# Patient Record
Sex: MALE | Race: WHITE
[De-identification: names, ages, dates, MRNs, and addresses within clinical notes are randomized per-mention and may not be internally consistent; named-entity substitution may affect disease eponyms.]

---

## 2017-01-31 ENCOUNTER — HOSPITAL ENCOUNTER (OUTPATIENT)
Dept: HOSPITAL 84 - D.CATH | Age: 52
Discharge: HOME | End: 2017-01-31
Attending: INTERNAL MEDICINE
Payer: COMMERCIAL

## 2017-01-31 VITALS
WEIGHT: 315 LBS | WEIGHT: 315 LBS | HEIGHT: 70 IN | BODY MASS INDEX: 45.1 KG/M2 | HEIGHT: 70 IN | BODY MASS INDEX: 45.1 KG/M2

## 2017-01-31 VITALS — DIASTOLIC BLOOD PRESSURE: 88 MMHG | SYSTOLIC BLOOD PRESSURE: 142 MMHG

## 2017-01-31 DIAGNOSIS — M17.9: ICD-10-CM

## 2017-01-31 DIAGNOSIS — I10: ICD-10-CM

## 2017-01-31 DIAGNOSIS — I20.9: Primary | ICD-10-CM

## 2017-01-31 DIAGNOSIS — R06.02: ICD-10-CM

## 2017-01-31 LAB
ANION GAP SERPL CALC-SCNC: 12.9 MMOL/L (ref 8–16)
BASOPHILS NFR BLD AUTO: 0.2 % (ref 0–2)
BUN SERPL-MCNC: 19 MG/DL (ref 7–18)
CALCIUM SERPL-MCNC: 9.3 MG/DL (ref 8.5–10.1)
CHLORIDE SERPL-SCNC: 104 MMOL/L (ref 98–107)
CO2 SERPL-SCNC: 28.3 MMOL/L (ref 21–32)
CREAT SERPL-MCNC: 0.8 MG/DL (ref 0.6–1.3)
EOSINOPHIL NFR BLD: 3.1 % (ref 0–7)
ERYTHROCYTE [DISTWIDTH] IN BLOOD BY AUTOMATED COUNT: 13.8 % (ref 11.5–14.5)
GLUCOSE SERPL-MCNC: 89 MG/DL (ref 74–106)
HCT VFR BLD CALC: 42.2 % (ref 42–54)
HGB BLD-MCNC: 13.8 G/DL (ref 13.5–17.5)
IMM GRANULOCYTES NFR BLD: 0.3 % (ref 0–5)
LYMPHOCYTES NFR BLD AUTO: 30.6 % (ref 15–50)
MCH RBC QN AUTO: 27.5 PG (ref 26–34)
MCHC RBC AUTO-ENTMCNC: 32.7 G/DL (ref 31–37)
MCV RBC: 84.1 FL (ref 80–100)
MONOCYTES NFR BLD: 12.2 % (ref 2–11)
NEUTROPHILS NFR BLD AUTO: 53.6 % (ref 40–80)
OSMOLALITY SERPL CALC.SUM OF ELEC: 281 MOSM/KG (ref 275–300)
PLATELET # BLD: 246 10X3/UL (ref 130–400)
PMV BLD AUTO: 9.3 FL (ref 7.4–10.4)
POTASSIUM SERPL-SCNC: 4.2 MMOL/L (ref 3.5–5.1)
RBC # BLD AUTO: 5.02 10X6/UL (ref 4.2–6.1)
SODIUM SERPL-SCNC: 141 MMOL/L (ref 136–145)
WBC # BLD AUTO: 6.5 10X3/UL (ref 4.8–10.8)

## 2017-01-31 NOTE — HEMODYNAMI
PATIENT:EULOGIO CASANOVA                                  MEDICAL RECORD: [id number]
: 63                                            LOCATION:D.CAT          
ACCT# [id number]                                       ADMISSION DATE: 17
 
 
 Generatedon:201714:27
Patient name: EULOGIO CASANOVA Patient #: [id number] Visit #: [id number] SSN: : 3
/1965 Date of
study: 2017
Page: Of
Hemodynamic Procedure Report
****************************
Patient Data
Patient Demographics
Procedure consent was obtained
First Name: EULOGIO            Gender: Male
Last Name: EDILBERTO            : 1965
Middle Initial: REGINE        Age: 51 year(s)
Patient #: [id number]       Race: Unknown
Visit #: [id number]
Accession #:
[id number]
Additional ID: [id number]
Contact details
Address: [address] [place] [address]        Phone: [phone]
[place] 
State: [place]
City: [place]
Zip code: [address]
Past Medical History
Performed procedures and imaging results
Date      Procedure          Procedure Results  Comments
2017 Stress testing     Positive           inferior
with SPECT MPI                        apically
Allergies
Allergen        Reaction        Date         Comments
Reported
Other allergy                   2017    hydrocodone,
oxycodone
Admission
Admission Data
Admission Date: 2017   Admission Time: 10:54
Admit Source: Other         Insurance Payor: Private
health insurance
Height (in.): 70            BSA: 2.67 (m2)
Height (cm.): 177.8         BMI: 51.08 (kg/m2)
Weight (lbs.): 356
Weight (kg.): 161.48
Lab Results
Lab Result Date: 2017  Lab Result Time: 11:30
Biochemistry
Name         Units    Result                Min      Max
BUN          mg/dl    19       --(----)*-   7        18
Creatinine   mg/dl    0.8      --(-*--)--   0.6      1.3
CBC
Name         Units    Result                Min      Max
Hemoglobin   g/dl     13.8     --(*---)--   13.5     17.5
Procedure
Procedure Types
 
Cath Procedure
Diagnostic Procedure
LHC
[facility] w/Coronaries
PCI Procedure
Coronary Stent Initial
Procedure Description
Procedure Date
Procedure Date: 2017
Procedure Start Time: 14:01
Procedure End Time: 14:24
Procedure Staff
Name                            Function
Leonard Steel MD             Performing Physician
Dung Lovett RN                  Nurse
Scott Rowley RT                  Monitor
Denys Gauthier RT                  Circulator
Sergey Calderon RT                  Scrub
Procedure Data
Cath Procedure
Fluoroscopy
Diagnostic fluoroscopy      Total fluoroscopy Time: 8
time: 8 min                 min
Diagnostic fluoroscopy      Total fluoroscopy dose:
dose: 1545 mGy              1545 mGy
Contrast Material
Contrast Material Type                       Amount (ml)
Isovue 300                                   101
Entry Location
Entry     Primary  Successful  Side  Size  Upsize Upsize Entry    Closure     Davey
ccessful  Closure
Location                             (Fr)  1 (Fr) 2 (Fr) Remarks  Device        
          Remarks
Radial                         Right 6 Fr                         Mechanical
artery                               Short                        Compression
Estimated blood loss: 10 ml
Diagnostic catheters
Device Type               Used For           End Catheter
Placement
Terumo 5Fr Chinle 110cm    Procedure
catheter
Procedure Complications
No complications
Procedure Medications
Medication           Administration Route Dosage
Oxygen               NC                   2 l/min
Benadryl             I.V.                 50 mg
Lidocaine 2%         added to field       20
Heparin Flush Bag    added to field       2 bags
(1000units/500ml NS)
0.9% NaCl            I.V.                 100 ml/hr
Versed               I.V.                 1 mg
Fentanyl             I.V.                 50 mcg
Versed               I.V.                 1 mg
Fentanyl             I.V.                 50 mcg
Radial Cocktail      I.A.                 1 syringe
(Verapomil 2mg/Nitro
400mcg/Heparin
1500units)
Fentanyl             I.V.                 50 mcg
 
Heparin Bolus        I.V.                 5000 units
Integrilin (Bolus    I.V.                 11.3 ml
2mg/ml)
Fentanyl             I.V.                 50 mcg
Plavix               P.O.                 600 mg
Hemodynamics
Rest
BSA: 2.67 (m2) HGB: 13.8 (g/dl) O2 Consumption: Estimated: 317.95 (ml/min) O2 Co
nsumption indexed:
Estimated:119.08 (ml/min/m) Heart Rate: 70 (bpm)
Pressure Samples
Time     Site     Value (mmHg) Purpose      Heart      Use
Rate(bpm)
14:04    LV       166/21,35    Snapshot     43
14:05    AO       143/98(120)  Pullback     86
Gradients
Valve  Time  Site Site 2      Mean    SEP/DFP    Peak To Heart  Use
1                (mmHg)  (sec/min)  Peak    Rate
(mmHg)  (bpm)
Aortic 14:05 LV   AO          0       0                  86
143/98(120)
Calculations
Valve    P-P      Mean      Valve     Index  Valve    Source
Name              Gradient  Area             Flow
(cm2)
Aortic            0
0
Snapshots
Pre Cath      Intra         NCS           Post Cath
Vital Signs
Time     Heart  Resp   SPO2 etCO2   WQ9gwad NIBP (mmHg)  Rhythm  Pain    Sedatio
n
Rate   (ipm)  (%)  (mmHg)  (mmHg)                       Status  Level
(bpm)
13:47:00 70     14     97   0       0       154/98(140)  NSR     0 (11)  10(A)
, No
pain
13:51:14 66     16     95   0       0       144/97(132)  NSR     0 (11)  10(A)
, No
pain
13:55:24 73     16     94   0       0       148/99(118)  NSR     0 (11)  10(A)
, No
pain
13:59:36 72     15     94   0       0       151/98(110)  NSR     0 (11)  10(A)
, No
pain
14:03:50 70     16     98   0       0       136/99(130)  NSR     0 (11)  9(A)
, No
pain
14:08:51 75     15     94   0       0       135/88(122)  NSR     0 (11)  9(A)
, No
pain
14:12:57 75     14     94   0       0       149/96(139)  NSR     0 (11)  9(A)
, No
pain
14:17:09 76     16     98   0       0       145/99(119)  NSR     0 (11)  10(A)
, No
pain
14:21:18 73     16     97   0       0       156/103(134) NSR     0 (11)  10(A)
, No
 
pain
Medications
Time     Medication       Route  Dose    Verified Delivered Reason          Note
s    Effectiveness
by       by
13:45:41 Oxygen           NC     2 l/min Leonard Razo    used for
[facility] Lov[facility] RN   procedure
MD
13:45:51 Benadryl         I.V.   50 mg   Leonard Razo    used for
[facility] Lov[facility] RN   procedure
MD
13:45:59 Lidocaine 2%     added  20ml    Leonard Valle   for local
to     vial    [facility]  anesthetic
field          MD       MD
13:46:04 Heparin Flush    added  2 bags  Leonard Valle   used for
Bag              to             [facility]  procedure
(1000units/500ml field MD AGUILAR
NS)
13:46:13 0.9% NaCl        I.V.   100     Leonard Razo    Per physician
ml/hr   [facility] Lovett RN, MD
13:59:16 Versed           I.V.   1 mg    Leonard Razo    for sedation
[facility] Lovett RN, MD
13:59:22 Fentanyl         I.V.   50 mcg  Leonard Razo    for sedation
[facility] Lovett RN, MD
14:02:48 Versed           I.V.   1 mg    Leonard Razo    for sedation
[facility] Lovett RN, MD
14:02:52 Fentanyl         I.V.   50 mcg  Leonard Razo    for sedation
[facility] Lovett RN, MD
14:03:01 Radial Cocktail  I.A.   1       Leonard Valle   for
(Verapomil              syringe [facility]
2mg/Nitro                       MD AGUILAR
400mcg/Heparin
1500units)
14:07:30 Fentanyl         I.V.   50 mcg  Leonard Razo    for sedation
[facility] Lovett RN, MD
14:11:11 Heparin Bolus    I.V.   5000    Leonard Razo    for             veri
fied
units   [facility] Lovett RN   anticoagulation with dr MD george
14:13:55 Integrilin       I.V.   11.3 ml Leonard Razo    for
(Bolus 2mg/ml)                  [facility] Lovett RN   antiplatelet
MD                 therapy
14:17:45 Fentanyl         I.V.   50 mcg  Leonard Razo    for sedation
[facility] Lovett RN, MD
14:22:01 Plavix           P.O.   600 mg  Leonard Razo    for
Damián Bell RN   antiplatelet
MD                 therapy
Procedure Log
Time     Note
13:10:33 Denys Gauthier RT(R) sent for patient. Start room use.
13:26:02 Admit Source: Other
13:26:30 Diagnostic Cath status Elective
13:26:42 Time tracking: Regular hours
 
13:26:46 Plan of Care:Hemodynamics will remain stable., Cardiac
rhythm will remain stable., Comfort level will be
maintained., Respiratory function will remain
adequate., Patient/ family verbilizes understanding of
procedure., Procedure tolerated without complication.,
Recovers from procedure without complications..
13:26:52 Patient received from Outpatients to [facility] 2 Alert and
oriented. Tansferred to table in Supine position.
13:26:58 Warm blankets applied, and armand hugger turned on for
patient comfort.
13:26:58 Correct patient and procedure confirmed by team.
13:26:59 Signed procedure consent form obtained from patient.
13:27:00 ECG and BP/O2 sat monitors applied to patient.
13:45:41 Oxygen 2 l/min NC was given by Dung Lovett RN; used
for procedure;
13:45:48 Vital chart was started
13:45:51 Benadryl 50 mg I.V. was given by Dung Lovett RN; used
for procedure;
13:45:59 Lidocaine 2% 20ml vial added to field was given by
Leonard Steel MD; for local anesthetic;
13:46:04 Heparin Flush Bag (1000units/500ml NS) 2 bags added to
field was given by Leonard Steel MD; used for
procedure;
13:46:13 0.9% NaCl 100 ml/hr I.V. was given by Dung Lovett RN;
Per physician;
13:47:09 H&P Date Dictated: 2017 Within 30 days and on
chart., H&P Addendum completed by physician on day of
procedure. (MUST COMPLETE FOR ALL OUTPATIENTS).
13:47:10 Pre-procedure instructions explained to patient.
13:47:11 Pre-op teaching completed and patient verbalized
understanding.
13:47:13 Family in waiting room.
13:47:14 Patient NPO since Midnight.
13:47:51 Patient allergic to Other allergyhydrocodone,
oxycodone
13:47:53 Is the patient allergic to Iodine/contrast media? No.
13:47:59 Is patient on blood thinner?No
13:48:00 Patient diabetic? No.
13:48:03 Previous problem with sedation/anesthesia? No ?
13:48:04 Snore? Yes
13:48:04 Sleep apnea? No
13:48:06 Deviated septum? No
13:48:07 Opens mouth fully? Yes
13:48:08 Sticks out tongue? Yes
13:48:10 Airway obstruction? No ?
13:48:12 Dentures? No ?
13:48:15 Modified Andrew's test Ulnar < 7 seconds
13:48:17 Patient pain scale 0/10 ?.
13:48:27 IV patent on arrival in left hand with 0.9% NaCl at
[facility]
13:49:05 Lab Result : BUN 19 mg/dl
13:49:05 Lab Result : Hemoglobin 13.8 g/dl
13:49:05 Lab Result : Creatinine 0.8 mg/dl
13:49:08 Lab results completed and on chart.
13:49:11 Right Radial & Right Groin area was prepped with
chlora-prep and draped in sterile fashion
13:49:14 Alarms reviewed by R. N.
13:49:14 Sharps counted by scrub and verified by R.N.
13:49:16 Use device set Radial Dx
13:49:17 Tegaderm 4 x 4 opened to sterile field.
 
13:49:18 Acist Manifold opened to sterile field.
13:49:19 Acist Hand Control opened to sterile field.
13:49:20 Acist Syringe opened to sterile field.
13:49:20 Cardinal Cath Pack opened to sterile field.
13:49:21 Bag Decanter opened to sterile field.
13:49:21 Terumo 6Fr Slender Glidesheath opened to sterile
field.
13:49:21 St Magno 260cm J .035 wire opened to sterile field.
13:49:26 Baseline sample Acquired.
13:49:31 Rhythm: sinus rhythm
13:49:33 Full Disclosure recording started
13:50:56 Patient Weight : 356 lbs
13:51:20 Patient Height : 70 inches
13:51:38 Insurance Payor : Private health insurance
13:55:57 Zero performed for pressure channel P1
13:58:22 --------ALL STOP TIME OUT------
13:58:23 Final Timeout: patient, procedure, and site verified
with staff and physician. All members of the team are
in agreement.
13:58:24 Right Radial & Right Groin site verified by team.
13:58:26 Physical assessment completed. ASA score P 2 - A
patient with mild systemic disease as per Leonard Steel MD.
13:58:29 Sedation plan: IV Moderate Sedation Versed, Fentanyl
13:59:16 Versed 1 mg I.V. was given by Dung Lovett RN; for
sedation;
13:59:22 Fentanyl 50 mcg I.V. was given by Dung Lovett RN; for
sedation;
14:01:27 Procedure started.
14:01:32 Local anesthetic to right radial artery with Lidocaine
2% by Leonard Steel MD.**INITIAL ACCESS ONLY**
14:02:08 A 6 Fr Short sheath was inserted into the Right Radial
artery
14:02:34 A Terumo 5Fr Chinle 110cm catheter was advanced over
the wire and used for Procedure.
14:02:48 Versed 1 mg I.V. was given by Dung Lovett RN; for
sedation;
14:02:52 Fentanyl 50 mcg I.V. was given by Dung Lovett RN; for
sedation;
14:03:01 Radial Cocktail (Verapomil 2mg/Nitro 400mcg/Heparin
1500units) 1 syringe I.A. was given by Leonard Steel MD; for vasodilation;
14:04:47 LV gram done using VILLA
14:04:50 Injector settings: Ml/sec: 5, Volume: 15,
14:04:56 EF : 55 %
14:05:11 LV hemodynamics recorded.
14:06:12 LCA angiography performed.
14:07:24 RCA angiography performed.
14:07:30 Fentanyl 50 mcg I.V. was given by Dung Lovett RN; for
sedation;
14::30 **ACC**Dominant side:Right
14:08:41 Catheter removed.
14:08:44 Proceeding to intervention.
14:08:55 Merit BasixCompak Inflation Kit opened to sterile
field.
14:08:55 **ACC** PCI Site: pLAD has 80% stenosis.
14:08:58 **ACC** Pre-intervention DOMINGO Flow is 1.
14:10:19 Cordis 6FR XBLAD 3.5 guide catheter opened to sterile
field.
14:10:20 Abbott Sodus 300cm 0.014 guide wire opened to sterile
 
field.
14:10:29 6 Fr xblad 3.5 guide catheter was inserted over the
wire
14:10:33 cougar wire advanced.
14:11:11 Heparin Bolus 5000 units I.V. was given by Dung Lovett
RN; for anticoagulation; verified with dr george
14:13:55 Integrilin (Bolus 2mg/ml) 11.3 ml I.V. was given by
Dung Lovett RN; for antiplatelet therapy;
14:15:33 Wire advanced across lesion.
14:15:37 Inflation number: 1 A [place] Sci Beaver 3.0 X 15
balloon was prepped and advanced across the Prox LAD,
then inflated to 10 MALENA for 0:21 (min:sec).
14:15:57 Balloon removed over the wire.
14:17:45 Fentanyl 50 mcg I.V. was given by Dung Lovett RN; for
sedation;
14:19:18 Inflation Number: 2 A Medtronic Integrity 3.5 X 15
stent was prepped and advanced across the Prox LAD.
The stent was deployed at 12 MALENA for 0:12 (min:sec).
14:19:27 **ACC** Post-intervention DOMINGO Flow is 3.
14:19:41 Stent catheter was removed intact over wire.
14:19:42 Wire removed.
14:19:43 Guide catheter removed.
14:20:05 Terumo TR Band Large opened to sterile field.
14:20:35 Sheath removed intact; hemostasis achieved with
Mechanical Compression to the Right Radial artery.
14:20:38 Procedure ended.(Physican Out)
14:21:27 Fluoroscopy time 08.00 minutes.
14::37 Fluoroscopy dose: 1545 mGy
14:: Flurop Dose total: 1545
14::42 Contrast amount:Isovue 300 101ml.
14:21:44 Sharps counted by scrub and verified by R.N.
14:21:48 TR band inflated with 12cc of air.
14:21:49 Insertion/operative site no bleeding no hematoma.
14:21:56 Post right radial artery:stable, soft, clean and dry
14:22:00 Post Procedure Pulses reassessed and unchanged
14:22:01 Plavix 600 mg P.O. was given by Dung Lovett RN; for
antiplatelet therapy;
14:22:03 Post-procedure physical assessment completed. ASA
score P 2 - A patient with mild systemic disease as
per Leonard Steel MD.
14:22:07 Post procedure rhythm: unchanged.
14:22:10 Estimated blood loss: 10 ml
14:22:13 Post procedure instruction explained to
patient.Patient verbalizes understanding.
14:22:14 Patient needs reinforcement of post procedure
teaching.
14:22:21 Procedure type changed to Cath procedure, Diagnostic
procedure, LHC, LHC w/Coronaries, PCI procedure,
Coronary Stent Initial
14:24:24 Procedure and supply charges have been captured,
reviewed, submitted and are correct.
14:24:26 Procedure Complication : No complications
14:24:28 Vital chart was stopped
14:24:29 See physician's report for complete and final results.
14:24:31 Report given to Post Procedure Room.
14:24:34 Patient transfered to Post Procedure Room with
Stretcher.
14:24:37 Procedure ended.
14:24:37 Full Disclosure recording stopped
14:24:47 **ACC-PCI Only** Patient was given prescriptions, or
 
instructed by Leonard Steel MD to start/continue
the following medications upon discharge: Plavix
14:24:50 End room use (Document Last)
Intervention Summary
Intervention Notes
Time     ActionType  Lesion and  Equipment Action#  Pressure  Duration
Attributes  Used
14:15:37 Inflate     Prox LAD    [place]    1        10        00:21
balloon                 Sci
Beaver
3.0 X 15
balloon
14:19:18 Place stent Prox LAD    Medtronic 2        12        00:12
Integrity
3.5 X 15
stent
Device Usage
Item Name   Manufacture  Quantity  Catalog Number  Hospital Part    Current Mini
mal Lot# /
Charge   Number  Stock   Stock   Serial#
Code
Tegaderm 4  3M           1         [id number]           [id number]   904279  998521  5
x 4
Acist       Acist        1         97780           464646   647225  368527  5
Manifold    Medical
Systems Inc
Acist Hand  Acist        1         85626           694423   274759  600768  5
Control     [employer]
Systems Inc
Acist       Acist        1         66188           597724   852713  297238  20
Syringe     Medical
Systems Inc
Cardinal    Cardinal     1         [id number]      871665   41314   759454  5
Cath Pack   Health
Bag         Microtek     1         2002S           192331   23758   529677  5
[employer]
Terumo 6Fr  Terumo       1         [id number]      851474   738860  248571  40
Slender
Glidesheath
St Magno     St Magno      1         767263          913167   939914  288211  30
260cm J
.035 wire
Terumo 5Fr  Terumo       1                  717560   314030  488478  5
Tiger 110cm
catheter
Cordis 6FR  Cardinal     1         [id number]        428469   869139  606311  10
XBLAD 3.5   Health
guide
catheter
Preciado      Abbott       1         [id number]      095279   632690  885918  1
Sodus      Vascular
300cm 0.014
guide wire
[place] Sci  [place]       1         [id number]  900509   048381  054710  1   
    [id number]
Beaver    Scientific
3.0 X 15
balloon
Medtronic   Medtronic    1         [id number]       122611   222666  800616  1   
    [id number]
 
Integrity
3.5 X 15
stent
Terumo TR   Terumo       1         [id number]       368052           130465  40
Band Large
Merit       Merit        1         [id number]          457196   947551  587337  15
Lightwave [employer] Medical
Inflation
Kit
Signature Audit [place]
Stage           Time        Signature      Unsigned
Intra-Procedure 2017   Scott Rowley
2:27:31 PM  RT(R)
Signatures
Monitor : Scott Rowley RT Signature :
______________________________
Date : ______________ Time :
______________
 
 
 
 
 
 
 
 
 
 
 
 
 
 
 
 
 
 
 
 
 
 
 
 
 
 
 
 
 
 
 
 
 
 
 
 
 
[address]                           
[place] [address]

## 2017-01-31 NOTE — NUR
REPORT CALLED TO YOUNG IN OUT PATIENT WITH PATIENT TRANSPORTED VIA
STRETCHER WIFE AT SIDE. CHEST PAIN IS DENIED AND VSS TR BAND REMAINS
TO R/WRIST CDI NO BLEEDING NO HEMATOMA NOTED

## 2017-02-03 NOTE — OP
PATIENT NAME:  EULOGIO CASANOVA                           MEDICAL RECORD: [id number]
:63                                             LOCATION:D.CAT          
                                                         ADMISSION DATE:        
SURGEON:  ANUJA SMITH MD          
 
 
DATE OF OPERATION:  2017
 
PROCEDURE:  Left heart catheterization, selective coronary angiography, right
radial approach.
 
CATHETERS:  A 5-Angolan sheath, 5/4 left and right García, pigtail catheter.
 
The procedure was well tolerated.  The patient returned to ma.  Sheath was
removed.  We proceeded to PTCA stenting in the LAD.
 
FINDINGS:  Left ventriculography in 30-degree VILLA view.  Normal wall motion and
normal systolic function.
 
CORONARY ANATOMY:
Left main:  Left main is free of disease.
 
LAD:  Has about 80% stenosis proximal portion before the takeoff of the first
septal and diagonal.
 
CIRCUMFLEX:  Free of disease.
 
RIGHT CORONARY ARTERY:  ____ dominant artery with a 60% stenosis distal PDA.
 
IMPRESSION:  Single-vessel disease involving the left anterior descending.
 
PLAN:  Intervention momentarily.
 
Using indwelling sheath, an XB LAD guide catheter provided fair guide catheter
support followed by 300 cm [place] XT wire placed across the totally occluded LAD
down towards the vessel.  Pre-deployment balloon used was a 3.0 x 15 mm Whitman
inflated up to 6 ____ atmospheres.  Stent used was a 3.5 x 15 mm Integrity
nondrug-eluting stent inflated up to 14 atmospheres for 45 seconds.  Final
injection shows excellent resolution of 90% proximal LAD stenosis, no
significant residual.  DOMINGO flow was 3 throughout procedure.  Plavix was loaded
in the lab.  Sheath was closed with TR band.
 
TRANSINT:[id number] Voice Confirmation ID: [id number] DOCUMENT ID: [id number]
                                           
                                           ANUJA SMITH MD          
 
 
 
Electronically Signed by ANUJA SMITH on 17 at 1336
 
CC:                                                             [id number]
DICTATION DATE: 17 1424     :     17      DEP CLI 
                                                                      17
[id number] [place] [address]

## 2017-02-03 NOTE — HP
PATIENT: EULOGIO CASANOVA                                 MEDICAL RECORD: [id number]
ACCOUNT: [id number]                                    LOCATION:[facility]         
: 63                                            ADMISSION DATE: 17
                                                         
 
                             HISTORY AND PHYSICAL EXAMINATION
 
 
HISTORY OF PRESENT ILLNESS:  A 51-year-old gentleman with a history of
hypertension as well as dyspnea on exertion, so this could be secondary to
deconditioning, underwent Cardiolite stress testing, which showed reversible
ischemia and he does have ____ coronary artery disease.  No history of
dyslipidemia, being admitted for diagnostic angiography.
 
PAST MEDICAL HISTORY:  Includes:
1.  History of hypertension.
2.  Osteoarthritis.
3.  Gastroesophageal reflux disease.
 
MEDICATIONS:  Includes lisinopril 20 q. day, Demerol 50 q.4 p.r.n., and
Lopressor 25 b.i.d.
 
PHYSICAL EXAMINATION:
GENERAL:  Pleasant middle-aged gentleman, appears stated age.
VITAL SIGNS:  Blood pressure 164/74 and pulse ____.
HEENT:  Normocephalic and atraumatic.
NECK:  No JVD or bruit.
HEART:  Regular.
LUNGS:  Fields clear.
ABDOMEN:  Soft, nontender.
EXTREMITIES:  Pulse 2+.  There is no edema.
NEUROLOGIC:  Grossly intact.
 
IMPRESSION:  Reversible ischemia in a gentleman with symptoms consistent with
angina.
 
PLAN:  Angiography intervention based on above.
 
TRANSINT:[id number] Voice Confirmation ID: [id number] DOCUMENT ID: [id number]
 
 
                                           
                                           ANUJA SMITH MD          
 
 
 
Electronically Signed by ANUJA SMITH on 17 at 1336
 
 
 
CC:                                                             [id number]
DICTATION DATE: 17 1422     :     17 1627      [facility] CLI 
                                                                      17
[place] [phone]

## 2017-04-19 ENCOUNTER — HOSPITAL ENCOUNTER (OUTPATIENT)
Dept: HOSPITAL 84 - D.RT | Age: 52
Discharge: HOME | End: 2017-04-19
Attending: FAMILY MEDICINE
Payer: COMMERCIAL

## 2017-04-19 VITALS — BODY MASS INDEX: 51.1 KG/M2

## 2017-04-19 DIAGNOSIS — R06.00: Primary | ICD-10-CM

## 2017-05-10 ENCOUNTER — HOSPITAL ENCOUNTER (OUTPATIENT)
Dept: HOSPITAL 84 - D.CT | Age: 52
Discharge: HOME | End: 2017-05-10
Attending: FAMILY MEDICINE
Payer: COMMERCIAL

## 2017-05-10 VITALS — BODY MASS INDEX: 51.1 KG/M2

## 2017-05-10 DIAGNOSIS — R10.9: Primary | ICD-10-CM

## 2017-06-23 ENCOUNTER — HOSPITAL ENCOUNTER (OUTPATIENT)
Dept: HOSPITAL 84 - D.CT | Age: 52
Discharge: HOME | End: 2017-06-23
Attending: FAMILY MEDICINE
Payer: COMMERCIAL

## 2017-06-23 VITALS — BODY MASS INDEX: 51.1 KG/M2

## 2017-06-23 DIAGNOSIS — R06.02: Primary | ICD-10-CM

## 2018-11-09 ENCOUNTER — HOSPITAL ENCOUNTER (OUTPATIENT)
Dept: HOSPITAL 84 - D.CT | Age: 53
Discharge: HOME | End: 2018-11-09
Attending: FAMILY MEDICINE
Payer: COMMERCIAL

## 2018-11-09 VITALS — BODY MASS INDEX: 51.1 KG/M2

## 2018-11-09 DIAGNOSIS — R10.9: Primary | ICD-10-CM
